# Patient Record
Sex: FEMALE | Race: WHITE | NOT HISPANIC OR LATINO | Employment: UNEMPLOYED | URBAN - METROPOLITAN AREA
[De-identification: names, ages, dates, MRNs, and addresses within clinical notes are randomized per-mention and may not be internally consistent; named-entity substitution may affect disease eponyms.]

---

## 2023-03-31 ENCOUNTER — OFFICE VISIT (OUTPATIENT)
Dept: URGENT CARE | Age: 33
End: 2023-03-31

## 2023-03-31 VITALS
OXYGEN SATURATION: 99 % | BODY MASS INDEX: 25.58 KG/M2 | HEIGHT: 62 IN | TEMPERATURE: 97.1 F | WEIGHT: 139 LBS | SYSTOLIC BLOOD PRESSURE: 118 MMHG | HEART RATE: 88 BPM | RESPIRATION RATE: 18 BRPM | DIASTOLIC BLOOD PRESSURE: 78 MMHG

## 2023-03-31 DIAGNOSIS — B34.9 VIRAL INFECTION: Primary | ICD-10-CM

## 2023-03-31 NOTE — PROGRESS NOTES
[address] [employer] [facility]        NAME: Xenia Art is a 29 y o  female  : 1990    MRN: [id number]  DATE: 2023  TIME: 10:38 AM    Assessment and Plan   Viral infection [B34 9]  1  Viral infection          - Rapid COVID negative    Patient Instructions   - Take Tylenol or Motrin as needed  - Continue Mucinex  - Take decongestant as needed  - Encourage proper hydration and rest  Follow up with PCP in 3-5 days  Proceed to  ER if symptoms worsen  Chief Complaint     Chief Complaint   Patient presents with   • Cold Like Symptoms     Chest and nasal congestion with thick yellow mucous, headache, cough, body ache, chest tightness  x 3days          History of Present Illness       30 y/o F presents for cold like symptoms x 3 days  Pt admits to chest and nasal congestion associated with thick yellow mucous, HA, cough, body ache x 3 days  Pts 3 kids are currently ill with similar symptoms  Using Mucinex  PMHx of asthma  Review of Systems   Review of Systems   Constitutional: Positive for chills and fatigue  Negative for fever  HENT: Positive for congestion, postnasal drip and rhinorrhea  Negative for ear discharge, ear pain, sinus pressure, sinus pain and sore throat  Eyes: Negative for redness and itching  Respiratory: Negative for chest tightness and shortness of breath            Current Medications       Current Outpatient Medications:   •  albuterol (PROVENTIL HFA,VENTOLIN HFA) 90 mcg/act inhaler, , Disp: , Rfl:     Current Allergies     Allergies as of 2023 - Reviewed 2023   Allergen Reaction Noted   • Morphine Itching and Rash 2017            The following portions of the patient's history were reviewed and updated as appropriate: allergies, current medications, past family history, past medical history, past social history, past surgical history and problem list      Past Medical History:   Diagnosis Date   • Anxiety    • Asthma        Past Surgical History: "  Procedure Laterality Date   •  SECTION     • CHOLECYSTECTOMY         History reviewed  No pertinent family history  Medications have been verified  Objective   /78   Pulse 88   Temp (!) 97 1 °F (36 2 °C)   Resp 18   Ht 5' 2\" (1 575 m)   Wt 63 kg (139 lb)   LMP 2023 (Exact Date)   SpO2 99%   BMI 25 42 kg/m²   Patient's last menstrual period was 2023 (exact date)  Physical Exam     Physical Exam  Vitals and nursing note reviewed  Constitutional:       General: She is not in acute distress  Appearance: She is not toxic-appearing  HENT:      Head: Normocephalic and atraumatic  Right Ear: Tympanic membrane, ear canal and external ear normal       Left Ear: Tympanic membrane, ear canal and external ear normal       Nose: Nose normal       Mouth/Throat:      Mouth: Mucous membranes are moist       Pharynx: No oropharyngeal exudate or posterior oropharyngeal erythema  Eyes:      Conjunctiva/sclera: Conjunctivae normal    Pulmonary:      Effort: Pulmonary effort is normal       Breath sounds: Normal breath sounds  Musculoskeletal:      Cervical back: No tenderness  Lymphadenopathy:      Cervical: No cervical adenopathy  Neurological:      Mental Status: She is alert                     "